# Patient Record
Sex: FEMALE | Race: WHITE | NOT HISPANIC OR LATINO | URBAN - METROPOLITAN AREA
[De-identification: names, ages, dates, MRNs, and addresses within clinical notes are randomized per-mention and may not be internally consistent; named-entity substitution may affect disease eponyms.]

---

## 2025-04-29 ENCOUNTER — APPOINTMENT (OUTPATIENT)
Facility: CLINIC | Age: 25
End: 2025-04-29
Payer: COMMERCIAL

## 2025-07-28 ENCOUNTER — APPOINTMENT (OUTPATIENT)
Facility: CLINIC | Age: 25
End: 2025-07-28
Payer: COMMERCIAL

## 2025-07-28 VITALS — BODY MASS INDEX: 24.88 KG/M2 | WEIGHT: 168 LBS | HEIGHT: 69 IN

## 2025-07-28 DIAGNOSIS — G50.1 ATYPICAL FACE PAIN: ICD-10-CM

## 2025-07-28 DIAGNOSIS — M95.0 NASAL DEFORMITY: Primary | ICD-10-CM

## 2025-07-28 PROCEDURE — 1036F TOBACCO NON-USER: CPT | Performed by: OTOLARYNGOLOGY

## 2025-07-28 PROCEDURE — 99203 OFFICE O/P NEW LOW 30 MIN: CPT | Performed by: OTOLARYNGOLOGY

## 2025-07-28 PROCEDURE — 31231 NASAL ENDOSCOPY DX: CPT | Performed by: OTOLARYNGOLOGY

## 2025-07-28 PROCEDURE — 3008F BODY MASS INDEX DOCD: CPT | Performed by: OTOLARYNGOLOGY

## 2025-07-28 RX ORDER — NORETHINDRONE ACETATE AND ETHINYL ESTRADIOL AND FERROUS FUMARATE 1MG-20(21)
1 KIT ORAL
COMMUNITY
Start: 2025-06-07

## 2025-07-28 RX ORDER — ALBUTEROL SULFATE 90 UG/1
AEROSOL, METERED RESPIRATORY (INHALATION)
COMMUNITY

## 2025-07-28 ASSESSMENT — PAIN SCALES - GENERAL: PAINLEVEL_OUTOF10: 2

## 2025-07-28 NOTE — PROGRESS NOTES
History Of Present Illness  Kat Jimenez is a 24 y.o. female presenting with nasal pain.    Patient states that she had trauma to her nose as a younger child and has had nasal pain for as long as she can remember.  She describes the pain as a pressure sensation of the bridge of her nose extending towards the sides of her nose.  She states that it typically feels worse in the winter.  She denies nasal obstruction.  Denies periorbital pain or pressure.  Denies recurrent sinus infections.  Denies significant nasal drainage.  No known environmental allergies.  No other trauma to her nose, no confirmed nasal fractures.  Denies prior surgery to her nose.  She also does have cosmetic concerns regarding her nose.  She feels that the bridge of her nose is too prominent.     Past Medical History  She has a past medical history of Asthma (2005), Headache, and Sleep difficulties.    Surgical History  She has a past surgical history that includes Other surgical history (06/11/2018).     Social History  She reports that she has never smoked. She has never used smokeless tobacco. She reports current alcohol use of about 7.0 standard drinks of alcohol per week. She reports that she does not use drugs.    Family History  Family History[1]     Allergies  Patient has no known allergies.    Review of Systems   Constitutional: Negative.    HENT: Negative.     Eyes: Negative.    Respiratory: Negative.     Cardiovascular: Negative.    Gastrointestinal: Negative.    Endocrine: Negative.    Genitourinary: Negative.    Musculoskeletal: Negative.    Skin: Negative.    Allergic/Immunologic: Negative.    Neurological: Negative.    Hematological: Negative.    Psychiatric/Behavioral: Negative.     These systems were reviewed and are negative unless otherwise stated in the HPI      Physical Exam     Constitutional   General appearance: Healthy-appearing, well-nourished, well groomed, in no acute distress.      Voice  Ability to communicate:  Normal communication without aids, normal voice quality    Head and Face   Head and face: Atraumatic with no masses, lesions, or scarring.    Salivary glands: No tenderness of the parotid glands or parotid masses. No tenderness of the submandibular glands or submandibular masses.    Facial strength: Normal strength and symmetry, no synkinesis or facial tic.     Eyes   Pupils and irises: EOM intact, PERRLA, conjunctiva non-injected.     Ears  External inspection of ears: Ears normally formed and free of lesions.     Nose   Dorsum Normal moderately over projected nasal dorsum with moderate dorsal hump.  Mildly widened dorsal horns.  No nasal lesions, lacerations or scars.  No polyps  Nasal Mucosa Normal  Nasal tip Normal  Septum Normal  Inferior turbinates Normal  Modified Davie maneuver Normal      Oral Cavity/Mouth   Lips, teeth, and gums: Normal lips, gums, and dentition.     Throat   Oropharynx: Mucosa moist, no lesions. Hard and soft palate normal. Tongue normal, no lesions or edema. No tonsillar masses or lesions. Normal tongue base.    Neck   Neck: Symmetrical, trachea midline.    Thyroid symmetrical, no enlargement, no tenderness, no nodules.     Pulmonary   Respiratory effort: Chest expands symmetrically. No audible wheeze.      Cardiovascular   Peripheral vascular system: No varicosities, carotid pulse normal, no edema. No jugular venous distension    Lymphatic   Palpation of cervical lymph nodes: No palpable lymph node enlargement, no submandibular adenopathy, no anterior cervical adenopathy, no supraclavicular adenopathy    Neurological/Psychiatric   Cranial nerves: Cranial nerves II - XII intact. Normal gait. No nystagmus  Orientation to person, place, and time: Normal.     Mood and affect: Normal.      Extremities   Appearance of extremities: Normal.       Procedure  After verbal consent  a nasal endoscopy was performed.  No lesions in the nasal cavity, middle meatus or sphenoethmoid recess were seen.   "No purulence was noted.           Last Recorded Vitals  Height 1.74 m (5' 8.5\"), weight 76.2 kg (168 lb).    Relevant Results  Prior to Admission medications   Medication Sig Start Date End Date Taking? Authorizing Provider   Aurovela Fe 1-20, 28, 1 mg-20 mcg (21)/75 mg (7) tablet Take 1 tablet by mouth early in the morning.. 6/7/25  Yes Historical Provider, MD   Ventolin HFA 90 mcg/actuation inhaler Inhale.    Historical Provider, MD     Imaging  No results found.    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  We discussed that surgery would not be guaranteed to improve the pain and could exacerbate her sensation.  We discussed possible neurology referral for further workup of atypical facial pain, but the patient is not interested at this time.  The patient has no structural abnormalities that I can see would cause this facial pain.    Patient is interested in potentially pursuing cosmetic rhinoplasty.  Her main goal is reduction of her dorsal hump.  Information was given to the patient.  Photodocumentation was performed.  The patient would like to consider and schedule at her convenience.  Patient will follow-up as needed.           Jonathan K Frankel, MD  Facial Plastic & Reconstructive Surgery  Otolaryngology - Head & Neck Surgery       [1]   Family History  Problem Relation Name Age of Onset    Asthma Father Sreedhar 0 - 9    Migraines Father Sreedhar 30 - 39    Asthma Sister Filomena 0 - 9    Cancer Paternal Grandfather Sreedhar Sr. 70 - 79    Migraines Mother Isrrael 30 - 39     "